# Patient Record
Sex: MALE | Race: WHITE
[De-identification: names, ages, dates, MRNs, and addresses within clinical notes are randomized per-mention and may not be internally consistent; named-entity substitution may affect disease eponyms.]

---

## 2020-06-18 ENCOUNTER — HOSPITAL ENCOUNTER (EMERGENCY)
Dept: HOSPITAL 8 - ED | Age: 55
Discharge: HOME | End: 2020-06-18
Payer: SELF-PAY

## 2020-06-18 VITALS — HEIGHT: 69 IN | BODY MASS INDEX: 27.72 KG/M2 | WEIGHT: 187.17 LBS

## 2020-06-18 VITALS — SYSTOLIC BLOOD PRESSURE: 108 MMHG | DIASTOLIC BLOOD PRESSURE: 72 MMHG

## 2020-06-18 DIAGNOSIS — S46.011A: Primary | ICD-10-CM
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
## 2020-06-18 PROCEDURE — 99283 EMERGENCY DEPT VISIT LOW MDM: CPT

## 2020-06-18 NOTE — NUR
On first contact pt ambulating back to room with a smooth and steady gait. 
Changed into gown, sitting up on gurney, given warm blanket for comfort. Pt C/O 
right shoulder pain and inability to fully move, denies trauma to area, no 
deformities noted, CMS intact.



BP/SPO2 monitoring in place. TM.

## 2020-07-31 ENCOUNTER — HOSPITAL ENCOUNTER (EMERGENCY)
Dept: HOSPITAL 8 - ED | Age: 55
LOS: 1 days | Discharge: HOME | End: 2020-08-01
Payer: SELF-PAY

## 2020-07-31 VITALS — WEIGHT: 198.42 LBS | BODY MASS INDEX: 33.06 KG/M2 | HEIGHT: 65 IN
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
## 2020-07-31 DIAGNOSIS — S51.811A: Primary | ICD-10-CM

## 2020-07-31 DIAGNOSIS — F10.121: ICD-10-CM
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
## 2020-07-31 DIAGNOSIS — F17.200: ICD-10-CM

## 2020-07-31 PROCEDURE — 90471 IMMUNIZATION ADMIN: CPT

## 2020-07-31 PROCEDURE — 90715 TDAP VACCINE 7 YRS/> IM: CPT

## 2020-07-31 PROCEDURE — 99283 EMERGENCY DEPT VISIT LOW MDM: CPT

## 2020-07-31 NOTE — NUR
WOUND CLEANED, STERI STRIPS APPLIED, BANDAGE PLACED ON. PATIENT VERBALIZED 
UNDERSTANDING OF WOUND CARE AND FOLLOW UP INSTRUCTIONS. NO NOTED FURTHER NEEDS 
AT THIS TIME. WILL CONTINUE TO MONITOR.

## 2020-08-01 VITALS — SYSTOLIC BLOOD PRESSURE: 109 MMHG | DIASTOLIC BLOOD PRESSURE: 64 MMHG

## 2020-08-01 NOTE — NUR
PATIENT RESTING IN BED, EVEN-UNLABORED RESPIRATIONS NOTED. NO NOTED NEEDS AT 
THIS TIME. WILL CONTINUE TO MONITOR.

## 2020-08-01 NOTE — NUR
PATIENT ABLE TO AMBULATE WITHOUT COMPLICATIONS. CLEARED FOR DISCHARGE. NO NOTED 
ACUTE DISTRESS. PATIENT PROVIDED WITH TAXI VOUCHER.